# Patient Record
Sex: MALE | Race: WHITE | Employment: FULL TIME | URBAN - METROPOLITAN AREA
[De-identification: names, ages, dates, MRNs, and addresses within clinical notes are randomized per-mention and may not be internally consistent; named-entity substitution may affect disease eponyms.]

---

## 2018-03-26 ENCOUNTER — TELEPHONE (OUTPATIENT)
Dept: SURGERY | Age: 53
End: 2018-03-26

## 2018-04-05 DIAGNOSIS — Z12.11 COLON CANCER SCREENING: Primary | ICD-10-CM

## 2018-05-04 ENCOUNTER — HOSPITAL ENCOUNTER (OUTPATIENT)
Dept: ENDOSCOPY | Age: 53
Discharge: HOME | End: 2018-05-04
Attending: SURGERY | Admitting: SURGERY

## 2018-05-04 VITALS
WEIGHT: 192 LBS | SYSTOLIC BLOOD PRESSURE: 122 MMHG | HEART RATE: 65 BPM | HEIGHT: 69 IN | TEMPERATURE: 98 F | BODY MASS INDEX: 28.44 KG/M2 | DIASTOLIC BLOOD PRESSURE: 90 MMHG | OXYGEN SATURATION: 97 % | RESPIRATION RATE: 14 BRPM

## 2018-05-04 PROCEDURE — 45380 COLONOSCOPY AND BIOPSY: CPT | Performed by: SURGERY

## 2018-05-04 PROCEDURE — 45385 COLONOSCOPY W/LESION REMOVAL: CPT | Performed by: SURGERY

## 2018-05-04 RX ORDER — SIMVASTATIN 10 MG
10 TABLET ORAL NIGHTLY
Status: ON HOLD | COMMUNITY
End: 2021-12-08

## 2018-05-04 RX ORDER — SODIUM CHLORIDE 0.9 % (FLUSH) 0.9 %
10 SYRINGE (ML) INJECTION PRN
Status: DISCONTINUED | OUTPATIENT
Start: 2018-05-04 | End: 2018-05-05 | Stop reason: HOSPADM

## 2018-05-04 RX ORDER — MONTELUKAST SODIUM 10 MG/1
10 TABLET ORAL NIGHTLY
COMMUNITY

## 2018-05-04 RX ORDER — SODIUM CHLORIDE 0.9 % (FLUSH) 0.9 %
10 SYRINGE (ML) INJECTION EVERY 12 HOURS SCHEDULED
Status: DISCONTINUED | OUTPATIENT
Start: 2018-05-04 | End: 2018-05-05 | Stop reason: HOSPADM

## 2018-05-04 RX ORDER — SODIUM CHLORIDE 9 MG/ML
INJECTION, SOLUTION INTRAVENOUS CONTINUOUS
Status: DISCONTINUED | OUTPATIENT
Start: 2018-05-04 | End: 2018-05-05 | Stop reason: HOSPADM

## 2018-05-04 RX ADMIN — SODIUM CHLORIDE: 9 INJECTION, SOLUTION INTRAVENOUS at 10:20

## 2018-05-04 ASSESSMENT — ENCOUNTER SYMPTOMS: SHORTNESS OF BREATH: 0

## 2018-05-04 ASSESSMENT — PAIN - FUNCTIONAL ASSESSMENT: PAIN_FUNCTIONAL_ASSESSMENT: 0-10

## 2018-05-17 ENCOUNTER — TELEPHONE (OUTPATIENT)
Dept: SURGERY | Age: 53
End: 2018-05-17

## 2021-09-28 ENCOUNTER — TELEPHONE (OUTPATIENT)
Dept: SURGERY | Age: 56
End: 2021-09-28

## 2021-09-28 NOTE — LETTER
[facility] - [facility] [address] [facility], [address]         p [phone]  f [phone]    Kasandra Mejia MD                        ENDOSCOPY ORDER   -- Time of order -- 21    2:58 PM      Facility: [facility]. # _________________                              Scheduled by: ____________       Procedure date & time: Jer@Publicate                               Pt arrival: 7am                                                                                        Patient name:  Ganesh Wanger     :  1965 PCP:  Melissa Stafford MD       Home phone:    [phone] (home)                                                     PROCEDURE:  Colonoscopy, possible polypectomy         [address]    DIAGNOSIS:  Polyp acending colon K63.5    Anesthesia: MAC     Time Needed:  30 minutes     Pt Position:  lateral, right side up         Outpatient   X           Pre-Op H & P to be done by: Kasandra Mejia MD                       X    PREP:  Miralax      Medications to be stopped 5 days before surgery: ____ASA_____    Additional / Special Orders:Second covid vaccine 2021                                                                                                                        Kasandra Mejia MD    Insurance:                                    ID #                                             Ph #     (secondary ?)       Date called ________     Tyrone Bonner to: ___________ @ _______      Precert Needed?  Yes  /  No    PreAuth # & Details _______________________________________________________                        ______ Sara Or to [facility] Surgery Authorization Team [phone]                                     ____E-mailed                 _____ Spoke to Pt / [address] [place] [address] [address]  Ph  [phone]        Fax  [phone]    MD Kasandra Beckford, MD    Instructions for Colonoscopy     Patient Name: [facility]:    [facility] of [address] [facility] [address] [place], [place]    Date of Surgery_11/03/2021__  Time to arrive _7am_____ at the Main Entrance      PLEASE BE CERTAIN TO FOLLOW THESE INSTRUCTIONS CAREFULLY BEFORE SURGERY    1. _x___ Do NOT eat or drink anything after midnight the evening before surgery. Food or drink intake   of any kind will result in the cancellation of surgery. 2. __x__ STOP all Blood Thinning medications AND Anti inflammatories; Aspirin, Coumadin & Plavix ect. 7 days prior to surgery. 3.  __x__ Clear liquid diet and bowel prep the day before surgery. See attached for                       details     AFTER SURGERY  1. A responsible adult is REQUIRED to accompany you to the hospital and remain there for your surgery. If this arrangement has not been made at the time of your surgery, your surgery may be cancelled  2. You should not be left alone for 24  48 hours following surgery. 3. You should not drive, sign any important documents or make any important decisions until you have fully recovered from anesthesia (approximately 24  48 hours) AND are off all narcotic pain medications. · Any paperwork needing completion for either your employer or insurance company can be faxed, mailed or dropped off at our office to my attention. Please allow 7 business days for the paperwork to be completed. You will be contacted once it has been completed at which time you will be able to pick it up or have it mailed. · NOTE: Due to HIPPA policy, completed paperwork can not be faxed and per SARAH SETH [facility] of 4-6-36, Fees for form completion may apply. If you have any questions, please feel free to call the number above.     MD Jonah Chin MD

## 2021-09-29 NOTE — TELEPHONE ENCOUNTER
Patient scheduled for colonoscopy with Dr. Dotty Holden on [email or web address] arrival at [address] [place] [address]    Patient is to stop ASA 7 days prior to procedure    Second covid vaccine was on 04/16/2021    Reviewed clear liquid diet and bowel prep instructions and emailed copy to patient at [email or web address]      Surgery order sent, outlook updated.

## 2021-12-07 ENCOUNTER — ANESTHESIA EVENT (OUTPATIENT)
Dept: ENDOSCOPY | Age: 56
End: 2021-12-07
Payer: COMMERCIAL

## 2021-12-07 ENCOUNTER — TELEPHONE (OUTPATIENT)
Dept: SURGERY | Age: 56
End: 2021-12-07

## 2021-12-07 NOTE — TELEPHONE ENCOUNTER
I have placed a reminder call to patient for upcoming procedure. Did you speak directly to patient or leave a voicemail? Voicemail    Prep? NPO 12AM  Colonoscopy prep    Must have a  that is over the age of 25. Must be a friend or family member that can be responsible for signing them out after surgery.     Arrive at the main entrance [facility] at 6:30AM

## 2021-12-08 ENCOUNTER — ANESTHESIA (OUTPATIENT)
Dept: ENDOSCOPY | Age: 56
End: 2021-12-08
Payer: COMMERCIAL

## 2021-12-08 ENCOUNTER — HOSPITAL ENCOUNTER (OUTPATIENT)
Age: 56
Setting detail: OUTPATIENT SURGERY
Discharge: HOME | End: 2021-12-08
Attending: SURGERY | Admitting: SURGERY
Payer: COMMERCIAL

## 2021-12-08 VITALS — OXYGEN SATURATION: 96 % | SYSTOLIC BLOOD PRESSURE: 113 MMHG | DIASTOLIC BLOOD PRESSURE: 68 MMHG

## 2021-12-08 VITALS
HEIGHT: 69 IN | WEIGHT: 195 LBS | BODY MASS INDEX: 28.88 KG/M2 | HEART RATE: 75 BPM | DIASTOLIC BLOOD PRESSURE: 89 MMHG | TEMPERATURE: 98.1 F | OXYGEN SATURATION: 99 % | RESPIRATION RATE: 16 BRPM | SYSTOLIC BLOOD PRESSURE: 114 MMHG

## 2021-12-08 DIAGNOSIS — K63.5 POLYP OF ASCENDING COLON, UNSPECIFIED TYPE: ICD-10-CM

## 2021-12-08 PROCEDURE — 2500000003 HC RX 250 WO HCPCS: Performed by: NURSE ANESTHETIST, CERTIFIED REGISTERED

## 2021-12-08 PROCEDURE — 3700000001 HC ADD 15 MINUTES (ANESTHESIA): Performed by: SURGERY

## 2021-12-08 PROCEDURE — 45380 COLONOSCOPY AND BIOPSY: CPT | Performed by: SURGERY

## 2021-12-08 PROCEDURE — 3609010600 HC COLONOSCOPY POLYPECTOMY SNARE/COLD BIOPSY: Performed by: SURGERY

## 2021-12-08 PROCEDURE — 7100000010 HC PHASE II RECOVERY - FIRST 15 MIN: Performed by: SURGERY

## 2021-12-08 PROCEDURE — 6360000002 HC RX W HCPCS: Performed by: NURSE ANESTHETIST, CERTIFIED REGISTERED

## 2021-12-08 PROCEDURE — 2580000003 HC RX 258: Performed by: NURSE ANESTHETIST, CERTIFIED REGISTERED

## 2021-12-08 PROCEDURE — 2709999900 HC NON-CHARGEABLE SUPPLY: Performed by: SURGERY

## 2021-12-08 PROCEDURE — 2580000003 HC RX 258: Performed by: ANESTHESIOLOGY

## 2021-12-08 PROCEDURE — 7100000011 HC PHASE II RECOVERY - ADDTL 15 MIN: Performed by: SURGERY

## 2021-12-08 PROCEDURE — 88305 TISSUE EXAM BY PATHOLOGIST: CPT

## 2021-12-08 PROCEDURE — 3700000000 HC ANESTHESIA ATTENDED CARE: Performed by: SURGERY

## 2021-12-08 RX ORDER — SODIUM CHLORIDE, SODIUM LACTATE, POTASSIUM CHLORIDE, CALCIUM CHLORIDE 600; 310; 30; 20 MG/100ML; MG/100ML; MG/100ML; MG/100ML
INJECTION, SOLUTION INTRAVENOUS CONTINUOUS PRN
Status: DISCONTINUED | OUTPATIENT
Start: 2021-12-08 | End: 2021-12-08 | Stop reason: SDUPTHER

## 2021-12-08 RX ORDER — SODIUM CHLORIDE 0.9 % (FLUSH) 0.9 %
10 SYRINGE (ML) INJECTION EVERY 12 HOURS SCHEDULED
Status: DISCONTINUED | OUTPATIENT
Start: 2021-12-08 | End: 2021-12-08 | Stop reason: HOSPADM

## 2021-12-08 RX ORDER — SODIUM CHLORIDE 9 MG/ML
25 INJECTION, SOLUTION INTRAVENOUS PRN
Status: DISCONTINUED | OUTPATIENT
Start: 2021-12-08 | End: 2021-12-08 | Stop reason: HOSPADM

## 2021-12-08 RX ORDER — LIDOCAINE HYDROCHLORIDE 20 MG/ML
INJECTION, SOLUTION EPIDURAL; INFILTRATION; INTRACAUDAL; PERINEURAL PRN
Status: DISCONTINUED | OUTPATIENT
Start: 2021-12-08 | End: 2021-12-08 | Stop reason: SDUPTHER

## 2021-12-08 RX ORDER — SODIUM CHLORIDE, SODIUM LACTATE, POTASSIUM CHLORIDE, CALCIUM CHLORIDE 600; 310; 30; 20 MG/100ML; MG/100ML; MG/100ML; MG/100ML
INJECTION, SOLUTION INTRAVENOUS CONTINUOUS
Status: DISCONTINUED | OUTPATIENT
Start: 2021-12-08 | End: 2021-12-08 | Stop reason: HOSPADM

## 2021-12-08 RX ORDER — SODIUM CHLORIDE 0.9 % (FLUSH) 0.9 %
10 SYRINGE (ML) INJECTION PRN
Status: DISCONTINUED | OUTPATIENT
Start: 2021-12-08 | End: 2021-12-08 | Stop reason: HOSPADM

## 2021-12-08 RX ORDER — PROPOFOL 10 MG/ML
INJECTION, EMULSION INTRAVENOUS PRN
Status: DISCONTINUED | OUTPATIENT
Start: 2021-12-08 | End: 2021-12-08 | Stop reason: SDUPTHER

## 2021-12-08 RX ORDER — ASPIRIN 81 MG/1
81 TABLET ORAL DAILY
COMMUNITY

## 2021-12-08 RX ORDER — SODIUM CHLORIDE 9 MG/ML
INJECTION, SOLUTION INTRAVENOUS CONTINUOUS
Status: DISCONTINUED | OUTPATIENT
Start: 2021-12-08 | End: 2021-12-08 | Stop reason: HOSPADM

## 2021-12-08 RX ADMIN — LIDOCAINE HYDROCHLORIDE 100 MG: 20 INJECTION, SOLUTION EPIDURAL; INFILTRATION; INTRACAUDAL; PERINEURAL at 08:09

## 2021-12-08 RX ADMIN — PROPOFOL 30 MG: 10 INJECTION, EMULSION INTRAVENOUS at 08:21

## 2021-12-08 RX ADMIN — SODIUM CHLORIDE, SODIUM LACTATE, POTASSIUM CHLORIDE, AND CALCIUM CHLORIDE: .6; .31; .03; .02 INJECTION, SOLUTION INTRAVENOUS at 07:50

## 2021-12-08 RX ADMIN — PROPOFOL 20 MG: 10 INJECTION, EMULSION INTRAVENOUS at 08:16

## 2021-12-08 RX ADMIN — PROPOFOL 50 MG: 10 INJECTION, EMULSION INTRAVENOUS at 08:12

## 2021-12-08 RX ADMIN — PROPOFOL 50 MG: 10 INJECTION, EMULSION INTRAVENOUS at 08:09

## 2021-12-08 RX ADMIN — SODIUM CHLORIDE, POTASSIUM CHLORIDE, SODIUM LACTATE AND CALCIUM CHLORIDE: 600; 310; 30; 20 INJECTION, SOLUTION INTRAVENOUS at 07:57

## 2021-12-08 ASSESSMENT — PULMONARY FUNCTION TESTS
PIF_VALUE: 0
PIF_VALUE: 1

## 2021-12-08 ASSESSMENT — PAIN - FUNCTIONAL ASSESSMENT
PAIN_FUNCTIONAL_ASSESSMENT: 0-10

## 2021-12-08 ASSESSMENT — PAIN SCALES - GENERAL: PAINLEVEL_OUTOF10: 0

## 2021-12-08 NOTE — ANESTHESIA POSTPROCEDURE EVALUATION
Department of Anesthesiology  Postprocedure Note    Patient: Caity Rojo  MRN: [id number]  YOB: 1965  Date of evaluation: 12/8/2021  Time:  8:50 AM     Procedure Summary     Date: 12/08/21 Room / Location: [facility]    Anesthesia Start: 0808 Anesthesia Stop: 0830    Procedure: COLONOSCOPY POLYPECTOMY SNARE/COLD BIOPSY Diagnosis:       Polyp of ascending colon, unspecified type      (Polyp of ascending colon, unspecified type [K63.5])    Surgeons: Hieu Fleming MD Responsible Provider: Manju Mcgovern MD    Anesthesia Type: MAC ASA Status: 2          Anesthesia Type: MAC    Shereen Phase I: Shereen Score: 10    Shereen Phase II: Shereen Score: 10    Last vitals: Reviewed and per EMR flowsheets.        Anesthesia Post Evaluation    Patient location during evaluation: PACU  Patient participation: complete - patient participated  Level of consciousness: awake and lethargic  Airway patency: patent  Nausea & Vomiting: no nausea and no vomiting  Complications: no  Cardiovascular status: hemodynamically stable and blood pressure returned to baseline  Respiratory status: spontaneous ventilation and nasal cannula  Hydration status: stable

## 2021-12-08 NOTE — ANESTHESIA PRE PROCEDURE
Department of Anesthesiology  Preprocedure Note       Name:  Taniya Young   Age:  49 y.o.  :  1965                                          MRN:  [id number]         Date:  2021      Surgeon: Gavi Signs):  Korey Luna MD    Procedure: Procedure(s):  COLONOSCOPY, POSSIBLE POLYPECTOMY    Medications prior to admission:   Prior to Admission medications    Medication Sig Start Date End Date Taking? Authorizing Provider   aspirin 81 MG EC tablet Take 81 mg by mouth daily   Yes Historical Provider, MD   ALBUTEROL SULFATE HFA IN Inhale into the lungs as needed   Yes Historical Provider, MD   montelukast (SINGULAIR) 10 MG tablet Take 10 mg by mouth nightly   Yes Historical Provider, MD   polyethylene glycol (GOLYTELY) 236 g solution Please follow prep instructions.  18  Yes Korey Luna MD       Current medications:    Current Facility-Administered Medications   Medication Dose Route Frequency Provider Last Rate Last Admin    sodium chloride flush 0.9 % injection 10 mL  10 mL IntraVENous 2 times per day Balinda Flattery, DO        sodium chloride flush 0.9 % injection 10 mL  10 mL IntraVENous PRN Balinda Flattery, DO        0.9 % sodium chloride infusion  25 mL IntraVENous PRN Balinda Flattery, DO        0.9 % sodium chloride infusion   IntraVENous Continuous Balinda Flattery, DO        lactated ringers infusion   IntraVENous Continuous Balinda Flattery, DO           Allergies:  No Known Allergies    Problem List:    Patient Active Problem List   Diagnosis Code    Adenomatous polyp of sigmoid colon D12.5    Polyp of ascending colon K63.5    Polyp of transverse colon K63.5       Past Medical History:        Diagnosis Date    Asthma     exercise induced    Hyperlipidemia        Past Surgical History:        Procedure Laterality Date    COLONOSCOPY  2018    HERNIA REPAIR         Social History:    Social History     Tobacco Use    Smoking status: Never Smoker    Smokeless tobacco: Never Used   Substance Use Topics    Alcohol use: Yes     Alcohol/week: 2.0 standard drinks     Types: 2 Glasses of wine per week     Comment: week                                Counseling given: Not Answered      Vital Signs (Current):   Vitals:    12/08/21 0721   BP: (!) 145/95   Pulse: 91   Resp: 16   SpO2: 96%   Weight: 195 lb (88.5 kg)   Height: 5' 9\" (1.753 m)                                              BP Readings from Last 3 Encounters:   12/08/21 (!) 145/95   05/04/18 (!) 122/90       NPO Status: Time of last liquid consumption: 2300                        Time of last solid consumption: 1700                        Date of last liquid consumption: 12/07/21                        Date of last solid food consumption: 12/06/21    BMI:   Wt Readings from Last 3 Encounters:   12/08/21 195 lb (88.5 kg)   05/04/18 192 lb (87.1 kg)     Body mass index is 28.8 kg/m². CBC: No results found for: WBC, RBC, HGB, HCT, MCV, RDW, PLT    CMP: No results found for: NA, K, CL, CO2, BUN, CREATININE, GFRAA, AGRATIO, LABGLOM, GLUCOSE, PROT, CALCIUM, BILITOT, ALKPHOS, AST, ALT    POC Tests: No results for input(s): POCGLU, POCNA, POCK, POCCL, POCBUN, POCHEMO, POCHCT in the last 72 hours.     Coags: No results found for: PROTIME, INR, APTT    HCG (If Applicable): No results found for: PREGTESTUR, PREGSERUM, HCG, HCGQUANT     ABGs: No results found for: PHART, PO2ART, XRD2QKL, ASF0FBQ, BEART, C2WHVRJD     Type & Screen (If Applicable):  No results found for: LABABO, LABRH    Drug/Infectious Status (If Applicable):  No results found for: HIV, HEPCAB    COVID-19 Screening (If Applicable): No results found for: COVID19        Anesthesia Evaluation  Patient summary reviewed and Nursing notes reviewed no history of anesthetic complications:   Airway: Mallampati: II  TM distance: >3 FB   Neck ROM: full  Mouth opening: > = 3 FB Dental: normal exam         Pulmonary:normal exam    (+) asthma:                            Cardiovascular:Negative CV ROS  Exercise tolerance: good (>4 METS),                     Neuro/Psych:   Negative Neuro/Psych ROS              GI/Hepatic/Renal: Neg GI/Hepatic/Renal ROS            Endo/Other: Negative Endo/Other ROS                    Abdominal:             Vascular: negative vascular ROS. Other Findings:           Anesthesia Plan      MAC     ASA 2       Induction: intravenous. Anesthetic plan and risks discussed with patient. Plan discussed with CRNA.     Attending anesthesiologist reviewed and agrees with Ryan Quach MD   12/8/2021

## 2021-12-08 NOTE — H&P
PRE-ENDOSCOPY H&P    Visit Date: 12/8/2021    History:     Franky Villanueva is a 49 y.o. male who presents today for endoscopy procedure. See A/P below for indications. Patient Active Problem List   Diagnosis    Adenomatous polyp of sigmoid colon    Polyp of ascending colon    Polyp of transverse colon     Scheduled Meds:   sodium chloride flush  10 mL IntraVENous 2 times per day     Continuous Infusions:   sodium chloride      sodium chloride      lactated ringers 125 mL/hr at 12/08/21 0757     PRN Meds:.sodium chloride flush, sodium chloride  Prior to Admission medications    Medication Sig Start Date End Date Taking? Authorizing Provider   aspirin 81 MG EC tablet Take 81 mg by mouth daily   Yes Historical Provider, MD   ALBUTEROL SULFATE HFA IN Inhale into the lungs as needed   Yes Historical Provider, MD   montelukast (SINGULAIR) 10 MG tablet Take 10 mg by mouth nightly   Yes Historical Provider, MD   polyethylene glycol (GOLYTELY) 236 g solution Please follow prep instructions. 4/14/18  Yes Gonzales Smith MD     No Known Allergies  Past Medical History:   Diagnosis Date    Asthma     exercise induced    Hyperlipidemia      Past Surgical History:   Procedure Laterality Date    COLONOSCOPY  05/04/2018    HERNIA REPAIR         Physical Exam:     BP (!) 145/95   Pulse 91   Temp 98.8 °F (37.1 °C) (Tympanic)   Resp 16   Ht 5' 9\" (1.753 m)   Wt 195 lb (88.5 kg)   SpO2 96%   BMI 28.80 kg/m²  Body mass index is 28.8 kg/m². Constitutional: Appears well-developed and well-nourished. Grooming appropriate. Head: Normocephalic, atraumatic. Eyes: No scleral icterus. Vision intact grossly. ENT: Hearing grossly intact. No facial deformity. Cardiovascular: Normal rate on monitor. Pulmonary/Chest: Effort normal. No respiratory distress. No wheezes. No use of accessory muscles. Musculoskeletal: Normal range of motion of UE. No gross deformity.    Neurological: Alert and oriented to person, place, and time. No gross deficits. Psychiatric: Normal mood and affect. Behavior normal. Oriented to person, place, and time. Abdomen: soft, NTTP, non distended    Recent labs/radiology reviewed as appropriate    Assessment/Plan:     Previous colonoscopy: yes - 3 yrs ago - [facility] - 4 polyps   Family history of colorectal cancer: no  Symptoms: no    Anesthesia to provide sedation. Please see their documentation regarding airway and ASA classification. Proceed as planned for endoscopy, possible polypectomy    Risks/benefits/alternatives of procedure discussed with patient and any present family members. Risks including, but not limited to: bleeding, perforation, post polypectomy syndrome, splenic injury, need for additional procedures or surgery, risks of anesthesia. Patient understands it is their responsibility to call office for pathology results if they do not hear from my office within 1-2 weeks. All questions answered.     Electronically signed by Shyla Douglas MD on 12/8/2021 at 8:06 AM

## 2021-12-09 NOTE — RESULT ENCOUNTER NOTE
1 small hyperplastic (non-precancerous) polyp removed. Recommend next screening colonoscopy in 5 years.     Thanks so much for the referral!  Yasemin Flynn - can you inform pt and update HM

## 2021-12-10 ENCOUNTER — TELEPHONE (OUTPATIENT)
Dept: SURGERY | Age: 56
End: 2021-12-10

## 2021-12-10 NOTE — TELEPHONE ENCOUNTER
----- Message from Anel Quinn MD sent at 12/9/2021 12:37 PM EST -----  1 small hyperplastic (non-precancerous) polyp removed. Recommend next screening colonoscopy in 5 years.     Thanks so much for the referral!  Violeta Chisholm - can you inform pt and update HM

## 2025-08-06 ENCOUNTER — APPOINTMENT (OUTPATIENT)
Dept: URBAN - METROPOLITAN AREA CLINIC 170 | Facility: CLINIC | Age: 60
Setting detail: DERMATOLOGY
End: 2025-08-06

## 2025-08-06 DIAGNOSIS — L82.1 OTHER SEBORRHEIC KERATOSIS: ICD-10-CM | Status: STABLE

## 2025-08-06 DIAGNOSIS — L81.4 OTHER MELANIN HYPERPIGMENTATION: ICD-10-CM | Status: STABLE

## 2025-08-06 DIAGNOSIS — D18.0 HEMANGIOMA: ICD-10-CM | Status: STABLE

## 2025-08-06 DIAGNOSIS — L57.0 ACTINIC KERATOSIS: ICD-10-CM | Status: INADEQUATELY CONTROLLED

## 2025-08-06 DIAGNOSIS — D22 MELANOCYTIC NEVI: ICD-10-CM | Status: STABLE

## 2025-08-06 PROBLEM — D18.01 HEMANGIOMA OF SKIN AND SUBCUTANEOUS TISSUE: Status: ACTIVE | Noted: 2025-08-06

## 2025-08-06 PROBLEM — D22.5 MELANOCYTIC NEVI OF TRUNK: Status: ACTIVE | Noted: 2025-08-06

## 2025-08-06 PROCEDURE — ? FULL BODY SKIN EXAM

## 2025-08-06 PROCEDURE — ? TREATMENT REGIMEN

## 2025-08-06 PROCEDURE — ? PRESCRIPTION MEDICATION MANAGEMENT

## 2025-08-06 PROCEDURE — ? COUNSELING

## 2025-08-06 ASSESSMENT — LOCATION DETAILED DESCRIPTION DERM
LOCATION DETAILED: RIGHT MEDIAL SUPERIOR CHEST
LOCATION DETAILED: RIGHT SUPERIOR UPPER BACK
LOCATION DETAILED: PERIUMBILICAL SKIN
LOCATION DETAILED: RIGHT MID-UPPER BACK
LOCATION DETAILED: UPPER STERNUM
LOCATION DETAILED: LEFT PROXIMAL DORSAL FOREARM
LOCATION DETAILED: RIGHT DISTAL DORSAL FOREARM
LOCATION DETAILED: LEFT SUPERIOR UPPER BACK
LOCATION DETAILED: MIDDLE STERNUM

## 2025-08-06 ASSESSMENT — LOCATION SIMPLE DESCRIPTION DERM
LOCATION SIMPLE: RIGHT FOREARM
LOCATION SIMPLE: ABDOMEN
LOCATION SIMPLE: RIGHT UPPER BACK
LOCATION SIMPLE: LEFT UPPER BACK
LOCATION SIMPLE: LEFT FOREARM
LOCATION SIMPLE: CHEST

## 2025-08-06 ASSESSMENT — LOCATION ZONE DERM
LOCATION ZONE: ARM
LOCATION ZONE: TRUNK

## (undated) DEVICE — FORCEPS BX L240CM JAW DIA2.8MM L CAP W/ NDL MIC MESH TOOTH

## (undated) DEVICE — CANNULA SAMP CO2 AD GRN 7FT CO2 AND 7FT O2 TBNG UNIV CONN